# Patient Record
Sex: MALE | Race: WHITE | Employment: FULL TIME | URBAN - METROPOLITAN AREA
[De-identification: names, ages, dates, MRNs, and addresses within clinical notes are randomized per-mention and may not be internally consistent; named-entity substitution may affect disease eponyms.]

---

## 2022-01-10 ENCOUNTER — TELEMEDICINE (OUTPATIENT)
Dept: INTERNAL MEDICINE CLINIC | Facility: CLINIC | Age: 47
End: 2022-01-10

## 2022-01-10 ENCOUNTER — TELEPHONE (OUTPATIENT)
Dept: INTERNAL MEDICINE CLINIC | Facility: CLINIC | Age: 47
End: 2022-01-10

## 2022-01-10 DIAGNOSIS — Z20.822 SUSPECTED 2019 NOVEL CORONAVIRUS INFECTION: ICD-10-CM

## 2022-01-10 DIAGNOSIS — R05.9 COUGH: ICD-10-CM

## 2022-01-10 DIAGNOSIS — J01.10 ACUTE NON-RECURRENT FRONTAL SINUSITIS: Primary | ICD-10-CM

## 2022-01-10 PROCEDURE — 99204 OFFICE O/P NEW MOD 45 MIN: CPT | Performed by: INTERNAL MEDICINE

## 2022-01-10 RX ORDER — ALBUTEROL SULFATE 90 UG/1
2 AEROSOL, METERED RESPIRATORY (INHALATION) EVERY 6 HOURS PRN
Qty: 1 EACH | Refills: 0 | Status: SHIPPED | OUTPATIENT
Start: 2022-01-10 | End: 2022-02-07

## 2022-01-10 RX ORDER — BENZONATATE 200 MG/1
200 CAPSULE ORAL 3 TIMES DAILY PRN
Qty: 30 CAPSULE | Refills: 0 | Status: SHIPPED | OUTPATIENT
Start: 2022-01-10 | End: 2022-02-07

## 2022-01-10 RX ORDER — AMOXICILLIN AND CLAVULANATE POTASSIUM 875; 125 MG/1; MG/1
1 TABLET, FILM COATED ORAL 2 TIMES DAILY
Qty: 14 TABLET | Refills: 0 | Status: SHIPPED | OUTPATIENT
Start: 2022-01-10 | End: 2022-02-07

## 2022-01-10 RX ORDER — ACETAMINOPHEN 500 MG
500 TABLET ORAL EVERY 6 HOURS PRN
COMMUNITY
End: 2022-02-07

## 2022-01-10 NOTE — TELEPHONE ENCOUNTER
Virtual/Telephone Consent    Fortino Miller verbally {consents to a Virtual/Telephone Check-In service on 1/10/22. Patient understands and accepts financial responsibility for any deductible, co-insurance and/or co-pays associated with this service.

## 2022-01-10 NOTE — PROGRESS NOTES
Virtual Telephone Check-In    Bisi Garcia verbally consents to a Virtual/Telephone Check-In visit on 01/10/22. Patient has been referred to the [facility] website at [email or web address] to review the yearly Consent to Treat document.     Patient unders hours as needed for Wheezing. 1 each 0   • benzonatate 200 MG Oral Cap Take 1 capsule (200 mg total) by mouth 3 (three) times daily as needed for cough.  30 capsule 0       PAST MEDICAL, SOCIAL, AND FAMILY HISTORY:  Tobacco:      Smoking status: [employer] following visit was completed using two-way, real-time interactive audio and/or video communication.   This has been done in good sumaya to provide continuity of care in the best interest of the provider-patient relationship, due to the ongoing public health

## 2022-01-11 ENCOUNTER — LAB ENCOUNTER (OUTPATIENT)
Dept: LAB | Facility: HOSPITAL | Age: 47
End: 2022-01-11
Attending: INTERNAL MEDICINE
Payer: COMMERCIAL

## 2022-01-11 DIAGNOSIS — Z20.822 SUSPECTED 2019 NOVEL CORONAVIRUS INFECTION: ICD-10-CM

## 2022-01-13 LAB — SARS-COV-2 RNA RESP QL NAA+PROBE: DETECTED

## 2022-01-25 ENCOUNTER — PATIENT MESSAGE (OUTPATIENT)
Dept: INTERNAL MEDICINE CLINIC | Facility: CLINIC | Age: 47
End: 2022-01-25

## 2022-01-25 NOTE — TELEPHONE ENCOUNTER
From: John Templeton  To: Jorge Neri MD  Sent: 1/25/2022 7:44 AM CST  Subject: Colonoscopy    Dr Suman Aponte- I am past due for a colonoscopy check up and an annual physical. Assuming i can schedule physical with you. How do we do the colonoscopy appointment?

## 2022-01-29 DIAGNOSIS — R05.9 COUGH: ICD-10-CM

## 2022-01-29 DIAGNOSIS — J01.10 ACUTE NON-RECURRENT FRONTAL SINUSITIS: ICD-10-CM

## 2022-01-29 RX ORDER — ALBUTEROL SULFATE 90 UG/1
AEROSOL, METERED RESPIRATORY (INHALATION)
Qty: 8.5 EACH | Refills: 0 | OUTPATIENT
Start: 2022-01-29

## 2022-02-07 ENCOUNTER — OFFICE VISIT (OUTPATIENT)
Dept: INTERNAL MEDICINE CLINIC | Facility: CLINIC | Age: 47
End: 2022-02-07
Payer: COMMERCIAL

## 2022-02-07 VITALS
WEIGHT: 254.31 LBS | DIASTOLIC BLOOD PRESSURE: 70 MMHG | HEIGHT: 77.5 IN | SYSTOLIC BLOOD PRESSURE: 100 MMHG | HEART RATE: 64 BPM | TEMPERATURE: 98 F | RESPIRATION RATE: 12 BRPM | BODY MASS INDEX: 29.72 KG/M2

## 2022-02-07 DIAGNOSIS — Z00.00 PHYSICAL EXAM, ANNUAL: Primary | ICD-10-CM

## 2022-02-07 DIAGNOSIS — Z12.11 SCREENING FOR COLON CANCER: ICD-10-CM

## 2022-02-07 PROCEDURE — 3008F BODY MASS INDEX DOCD: CPT | Performed by: INTERNAL MEDICINE

## 2022-02-07 PROCEDURE — 99396 PREV VISIT EST AGE 40-64: CPT | Performed by: INTERNAL MEDICINE

## 2022-02-07 PROCEDURE — 3074F SYST BP LT 130 MM HG: CPT | Performed by: INTERNAL MEDICINE

## 2022-02-07 PROCEDURE — 3078F DIAST BP <80 MM HG: CPT | Performed by: INTERNAL MEDICINE

## 2022-02-11 PROCEDURE — 3052F HG A1C>EQUAL 8.0%<EQUAL 9.0%: CPT | Performed by: INTERNAL MEDICINE

## 2022-02-12 LAB
ABSOLUTE BASOPHILS: 48 CELLS/UL (ref 0–200)
ABSOLUTE EOSINOPHILS: 163 CELLS/UL (ref 15–500)
ABSOLUTE LYMPHOCYTES: 2285 CELLS/UL (ref 850–3900)
ABSOLUTE MONOCYTES: 585 CELLS/UL (ref 200–950)
ABSOLUTE NEUTROPHILS: 3720 CELLS/UL (ref 1500–7800)
ALBUMIN/GLOBULIN RATIO: 1.6 (CALC) (ref 1–2.5)
ALBUMIN: 4.7 G/DL (ref 3.6–5.1)
ALKALINE PHOSPHATASE: 162 U/L (ref 36–130)
ALT: 67 U/L (ref 9–46)
AST: 36 U/L (ref 10–40)
BASOPHILS: 0.7 %
BILIRUBIN, TOTAL: 1.8 MG/DL (ref 0.2–1.2)
BUN: 15 MG/DL (ref 7–25)
CALCIUM: 10.5 MG/DL (ref 8.6–10.3)
CARBON DIOXIDE: 27 MMOL/L (ref 20–32)
CHLORIDE: 100 MMOL/L (ref 98–110)
CHOL/HDLC RATIO: 5.2 (CALC)
CHOLESTEROL, TOTAL: 173 MG/DL
CREATININE: 0.89 MG/DL (ref 0.6–1.35)
EGFR IF AFRICN AM: 118 ML/MIN/1.73M2
EGFR IF NONAFRICN AM: 102 ML/MIN/1.73M2
GLOBULIN: 3 G/DL (CALC) (ref 1.9–3.7)
GLUCOSE: 239 MG/DL (ref 65–99)
HDL CHOLESTEROL: 33 MG/DL
HEMATOCRIT: 47.8 % (ref 38.5–50)
HEMOGLOBIN A1C: 8.6 % OF TOTAL HGB
HEMOGLOBIN: 15.9 G/DL (ref 13.2–17.1)
LDL-CHOLESTEROL: 100 MG/DL (CALC)
LYMPHOCYTES: 33.6 %
MCH: 28.9 PG (ref 27–33)
MCHC: 33.3 G/DL (ref 32–36)
MCV: 86.8 FL (ref 80–100)
MONOCYTES: 8.6 %
MPV: 10.4 FL (ref 7.5–12.5)
NEUTROPHILS: 54.7 %
NON-HDL CHOLESTEROL: 140 MG/DL (CALC)
PLATELET COUNT: 183 THOUSAND/UL (ref 140–400)
POTASSIUM: 4.4 MMOL/L (ref 3.5–5.3)
PROTEIN, TOTAL: 7.7 G/DL (ref 6.1–8.1)
RDW: 13.2 % (ref 11–15)
RED BLOOD CELL COUNT: 5.51 MILLION/UL (ref 4.2–5.8)
SODIUM: 136 MMOL/L (ref 135–146)
TRIGLYCERIDES: 282 MG/DL
TSH W/REFLEX TO FT4: 1.35 MIU/L (ref 0.4–4.5)
WHITE BLOOD CELL COUNT: 6.8 THOUSAND/UL (ref 3.8–10.8)

## 2022-02-14 ENCOUNTER — PATIENT MESSAGE (OUTPATIENT)
Dept: INTERNAL MEDICINE CLINIC | Facility: CLINIC | Age: 47
End: 2022-02-14

## 2022-02-15 ENCOUNTER — TELEPHONE (OUTPATIENT)
Dept: INTERNAL MEDICINE CLINIC | Facility: CLINIC | Age: 47
End: 2022-02-15

## 2022-02-15 NOTE — TELEPHONE ENCOUNTER
Incoming (mail or fax):  fax  Received from:  Dr Peter Roberts office  Documentation given to:  [facility] records

## 2022-02-16 ENCOUNTER — OFFICE VISIT (OUTPATIENT)
Dept: INTERNAL MEDICINE CLINIC | Facility: CLINIC | Age: 47
End: 2022-02-16
Payer: COMMERCIAL

## 2022-02-16 ENCOUNTER — TELEPHONE (OUTPATIENT)
Dept: INTERNAL MEDICINE CLINIC | Facility: CLINIC | Age: 47
End: 2022-02-16

## 2022-02-16 VITALS
SYSTOLIC BLOOD PRESSURE: 128 MMHG | HEIGHT: 77.5 IN | HEART RATE: 72 BPM | RESPIRATION RATE: 12 BRPM | WEIGHT: 252.19 LBS | DIASTOLIC BLOOD PRESSURE: 74 MMHG | BODY MASS INDEX: 29.48 KG/M2 | TEMPERATURE: 98 F

## 2022-02-16 DIAGNOSIS — E78.5 HYPERLIPIDEMIA, UNSPECIFIED HYPERLIPIDEMIA TYPE: ICD-10-CM

## 2022-02-16 DIAGNOSIS — R74.8 ELEVATED LIVER ENZYMES: Primary | ICD-10-CM

## 2022-02-16 DIAGNOSIS — E11.65 TYPE 2 DIABETES MELLITUS WITH HYPERGLYCEMIA, WITHOUT LONG-TERM CURRENT USE OF INSULIN (HCC): ICD-10-CM

## 2022-02-16 PROCEDURE — 3078F DIAST BP <80 MM HG: CPT | Performed by: INTERNAL MEDICINE

## 2022-02-16 PROCEDURE — 3008F BODY MASS INDEX DOCD: CPT | Performed by: INTERNAL MEDICINE

## 2022-02-16 PROCEDURE — 99214 OFFICE O/P EST MOD 30 MIN: CPT | Performed by: INTERNAL MEDICINE

## 2022-02-16 PROCEDURE — 3074F SYST BP LT 130 MM HG: CPT | Performed by: INTERNAL MEDICINE

## 2022-02-16 RX ORDER — METFORMIN HYDROCHLORIDE 500 MG/1
1000 TABLET, EXTENDED RELEASE ORAL 2 TIMES DAILY WITH MEALS
Qty: 120 TABLET | Refills: 0 | Status: SHIPPED | OUTPATIENT
Start: 2022-02-16 | End: 2022-03-21

## 2022-02-16 NOTE — TELEPHONE ENCOUNTER
New diagnosis of diabetes. rx done for supplies and glucometer. Please fax to his pharmacy. Let him know this was done, initially we had planned for him to get this at diabetic education but I think its better he get it from his pharmacy and bring it with him to diabetic education to learn how to use it. Thanks.

## 2022-02-17 ENCOUNTER — TELEPHONE (OUTPATIENT)
Dept: ENDOCRINOLOGY CLINIC | Facility: CLINIC | Age: 47
End: 2022-02-17

## 2022-02-18 ENCOUNTER — MED REC SCAN ONLY (OUTPATIENT)
Dept: INTERNAL MEDICINE CLINIC | Facility: CLINIC | Age: 47
End: 2022-02-18

## 2022-02-21 ENCOUNTER — TELEPHONE (OUTPATIENT)
Dept: INTERNAL MEDICINE CLINIC | Facility: CLINIC | Age: 47
End: 2022-02-21

## 2022-02-21 NOTE — TELEPHONE ENCOUNTER
Incoming (mail or fax):  fax  Received from:  [facility]   Documentation given to:  [address]

## 2022-02-24 ENCOUNTER — DIABETIC EDUCATION (OUTPATIENT)
Dept: ENDOCRINOLOGY CLINIC | Facility: CLINIC | Age: 47
End: 2022-02-24
Payer: COMMERCIAL

## 2022-02-24 VITALS — HEIGHT: 77 IN | WEIGHT: 252 LBS | BODY MASS INDEX: 29.76 KG/M2

## 2022-02-24 DIAGNOSIS — E11.65 TYPE 2 DIABETES MELLITUS WITH HYPERGLYCEMIA, WITHOUT LONG-TERM CURRENT USE OF INSULIN (HCC): Primary | ICD-10-CM

## 2022-02-24 PROCEDURE — G0108 DIAB MANAGE TRN  PER INDIV: HCPCS | Performed by: DIETITIAN, REGISTERED

## 2022-02-24 PROCEDURE — 3008F BODY MASS INDEX DOCD: CPT | Performed by: DIETITIAN, REGISTERED

## 2022-03-04 ENCOUNTER — HOSPITAL ENCOUNTER (OUTPATIENT)
Dept: ULTRASOUND IMAGING | Age: 47
Discharge: HOME | End: 2022-03-04
Attending: INTERNAL MEDICINE
Payer: COMMERCIAL

## 2022-03-04 DIAGNOSIS — R74.8 ELEVATED LIVER ENZYMES: ICD-10-CM

## 2022-03-04 PROCEDURE — 76700 US EXAM ABDOM COMPLETE: CPT | Performed by: INTERNAL MEDICINE

## 2022-03-10 PROBLEM — Z12.11 SPECIAL SCREENING FOR MALIGNANT NEOPLASM OF COLON: Status: ACTIVE | Noted: 2022-03-10

## 2022-03-10 PROBLEM — D12.8 BENIGN NEOPLASM OF RECTUM: Status: ACTIVE | Noted: 2022-03-10

## 2022-03-11 RX ORDER — POLYETHYLENE GLYCOL 3350, SODIUM SULFATE ANHYDROUS, SODIUM BICARBONATE, SODIUM CHLORIDE, POTASSIUM CHLORIDE 236; 22.74; 6.74; 5.86; 2.97 G/4L; G/4L; G/4L; G/4L; G/4L
4000 POWDER, FOR SOLUTION ORAL ONCE
COMMUNITY
Start: 2022-02-10 | End: 2022-03-17 | Stop reason: ALTCHOICE

## 2022-03-11 RX ORDER — LANCETS 33 GAUGE
1 EACH MISCELLANEOUS DAILY
COMMUNITY
Start: 2022-02-18

## 2022-03-11 RX ORDER — METFORMIN HYDROCHLORIDE 500 MG/1
TABLET, EXTENDED RELEASE ORAL
Qty: 120 TABLET | Refills: 0 | OUTPATIENT
Start: 2022-03-11

## 2022-03-11 RX ORDER — BLOOD-GLUCOSE METER
1 EACH MISCELLANEOUS DAILY
COMMUNITY
Start: 2022-02-16

## 2022-03-11 RX ORDER — BLOOD SUGAR DIAGNOSTIC
STRIP MISCELLANEOUS DAILY
COMMUNITY
Start: 2022-02-18

## 2022-03-11 NOTE — TELEPHONE ENCOUNTER
LMTCB to confirm if pt will run out of Medication prior to 3/17/22 apt with Dr. Carla Garland for Follow Up - DM.

## 2022-03-11 NOTE — TELEPHONE ENCOUNTER
Pt returned call and Pt will have enough of the medication until the appt. Pt was reminded to bring blood sugar log. Marylu Keating stated that we will just deny this refill request and refill at appt.  OK to deny refill request

## 2022-03-17 ENCOUNTER — OFFICE VISIT (OUTPATIENT)
Dept: INTERNAL MEDICINE CLINIC | Facility: CLINIC | Age: 47
End: 2022-03-17
Payer: COMMERCIAL

## 2022-03-17 VITALS
RESPIRATION RATE: 14 BRPM | TEMPERATURE: 98 F | BODY MASS INDEX: 28.5 KG/M2 | WEIGHT: 243.81 LBS | OXYGEN SATURATION: 96 % | HEART RATE: 67 BPM | HEIGHT: 77.5 IN | SYSTOLIC BLOOD PRESSURE: 108 MMHG | DIASTOLIC BLOOD PRESSURE: 70 MMHG

## 2022-03-17 DIAGNOSIS — Z23 NEED FOR VACCINATION: ICD-10-CM

## 2022-03-17 DIAGNOSIS — E11.65 TYPE 2 DIABETES MELLITUS WITH HYPERGLYCEMIA, WITHOUT LONG-TERM CURRENT USE OF INSULIN (HCC): ICD-10-CM

## 2022-03-17 DIAGNOSIS — E78.5 HYPERLIPIDEMIA, UNSPECIFIED HYPERLIPIDEMIA TYPE: ICD-10-CM

## 2022-03-17 DIAGNOSIS — R74.8 ELEVATED LIVER ENZYMES: ICD-10-CM

## 2022-03-17 PROCEDURE — 3078F DIAST BP <80 MM HG: CPT | Performed by: INTERNAL MEDICINE

## 2022-03-17 PROCEDURE — 99214 OFFICE O/P EST MOD 30 MIN: CPT | Performed by: INTERNAL MEDICINE

## 2022-03-17 PROCEDURE — 3008F BODY MASS INDEX DOCD: CPT | Performed by: INTERNAL MEDICINE

## 2022-03-17 PROCEDURE — 3074F SYST BP LT 130 MM HG: CPT | Performed by: INTERNAL MEDICINE

## 2022-03-20 ENCOUNTER — PATIENT MESSAGE (OUTPATIENT)
Dept: INTERNAL MEDICINE CLINIC | Facility: CLINIC | Age: 47
End: 2022-03-20

## 2022-03-21 RX ORDER — METFORMIN HYDROCHLORIDE 500 MG/1
1000 TABLET, EXTENDED RELEASE ORAL 2 TIMES DAILY WITH MEALS
Qty: 360 TABLET | Refills: 0 | Status: SHIPPED | OUTPATIENT
Start: 2022-03-21

## 2022-03-21 NOTE — TELEPHONE ENCOUNTER
From: Moni Burgos  To: Kodak March MD  Sent: 3/20/2022 10:10 AM CDT  Subject: Metformin refill    Hi Dr Ashely Griffin-    I will need a Metformin refill since I will continue taking it. The initial prescription was only 30 days and the refill request from pharmacy was rejected until we met.       Thanks,    Cristhian Sears

## 2022-03-21 NOTE — TELEPHONE ENCOUNTER
Last OV pertinent to medication 3/17/22  Last refill date: 2/16/22 #/refills: 120 w/ 0  When patient was asked to return for OV: 3 months  Upcomming appt/reason: None scheduled  Labs:   Last A1c value was 8.6% done 2/11/2022.

## 2022-04-20 RX ORDER — BLOOD SUGAR DIAGNOSTIC
STRIP MISCELLANEOUS
Qty: 100 STRIP | Refills: 2 | Status: SHIPPED | OUTPATIENT
Start: 2022-04-20

## 2022-06-03 PROCEDURE — 3044F HG A1C LEVEL LT 7.0%: CPT | Performed by: INTERNAL MEDICINE

## 2022-06-03 PROCEDURE — 3061F NEG MICROALBUMINURIA REV: CPT | Performed by: INTERNAL MEDICINE

## 2022-06-04 LAB
ALBUMIN/GLOBULIN RATIO: 1.6 (CALC) (ref 1–2.5)
ALBUMIN: 4.4 G/DL (ref 3.6–5.1)
ALKALINE PHOSPHATASE: 92 U/L (ref 36–130)
ALT: 22 U/L (ref 9–46)
AST: 18 U/L (ref 10–40)
BILIRUBIN, TOTAL: 1.2 MG/DL (ref 0.2–1.2)
BUN: 18 MG/DL (ref 7–25)
CALCIUM: 10.1 MG/DL (ref 8.6–10.3)
CARBON DIOXIDE: 28 MMOL/L (ref 20–32)
CHLORIDE: 105 MMOL/L (ref 98–110)
CHOL/HDLC RATIO: 4.6 (CALC)
CHOLESTEROL, TOTAL: 171 MG/DL
CREATININE, RANDOM URINE: 218 MG/DL (ref 20–320)
CREATININE: 0.89 MG/DL (ref 0.6–1.35)
EGFR IF AFRICN AM: 117 ML/MIN/1.73M2
EGFR IF NONAFRICN AM: 101 ML/MIN/1.73M2
GLOBULIN: 2.7 G/DL (CALC) (ref 1.9–3.7)
GLUCOSE: 110 MG/DL (ref 65–99)
HDL CHOLESTEROL: 37 MG/DL
HEMOGLOBIN A1C: 4.9 % OF TOTAL HGB
LDL-CHOLESTEROL: 111 MG/DL (CALC)
MICROALBUMIN/CREATININE RATIO, RANDOM URINE: 8 MCG/MG CREAT
MICROALBUMIN: 1.7 MG/DL
NON-HDL CHOLESTEROL: 134 MG/DL (CALC)
POTASSIUM: 4.4 MMOL/L (ref 3.5–5.3)
PROTEIN, TOTAL: 7.1 G/DL (ref 6.1–8.1)
SODIUM: 139 MMOL/L (ref 135–146)
TRIGLYCERIDES: 122 MG/DL

## 2022-06-19 DIAGNOSIS — E11.65 TYPE 2 DIABETES MELLITUS WITH HYPERGLYCEMIA, WITHOUT LONG-TERM CURRENT USE OF INSULIN (HCC): ICD-10-CM

## 2022-06-20 ENCOUNTER — OFFICE VISIT (OUTPATIENT)
Dept: INTERNAL MEDICINE CLINIC | Facility: CLINIC | Age: 47
End: 2022-06-20
Payer: COMMERCIAL

## 2022-06-20 VITALS
SYSTOLIC BLOOD PRESSURE: 98 MMHG | TEMPERATURE: 98 F | RESPIRATION RATE: 12 BRPM | WEIGHT: 239.69 LBS | HEIGHT: 77.5 IN | HEART RATE: 76 BPM | DIASTOLIC BLOOD PRESSURE: 62 MMHG | BODY MASS INDEX: 28.01 KG/M2

## 2022-06-20 DIAGNOSIS — E11.65 TYPE 2 DIABETES MELLITUS WITH HYPERGLYCEMIA, WITHOUT LONG-TERM CURRENT USE OF INSULIN (HCC): ICD-10-CM

## 2022-06-20 DIAGNOSIS — E78.5 HYPERLIPIDEMIA, UNSPECIFIED HYPERLIPIDEMIA TYPE: Primary | ICD-10-CM

## 2022-06-20 DIAGNOSIS — K76.0 FATTY LIVER: ICD-10-CM

## 2022-06-20 PROCEDURE — 3074F SYST BP LT 130 MM HG: CPT | Performed by: INTERNAL MEDICINE

## 2022-06-20 PROCEDURE — 3008F BODY MASS INDEX DOCD: CPT | Performed by: INTERNAL MEDICINE

## 2022-06-20 PROCEDURE — 3078F DIAST BP <80 MM HG: CPT | Performed by: INTERNAL MEDICINE

## 2022-06-20 PROCEDURE — 99214 OFFICE O/P EST MOD 30 MIN: CPT | Performed by: INTERNAL MEDICINE

## 2022-06-20 RX ORDER — METFORMIN HYDROCHLORIDE 500 MG/1
1000 TABLET, EXTENDED RELEASE ORAL 2 TIMES DAILY WITH MEALS
Qty: 360 TABLET | Refills: 1 | Status: SHIPPED | OUTPATIENT
Start: 2022-06-20

## 2022-06-21 RX ORDER — METFORMIN HYDROCHLORIDE 500 MG/1
TABLET, EXTENDED RELEASE ORAL
Qty: 360 TABLET | Refills: 0 | OUTPATIENT
Start: 2022-06-21

## 2022-09-07 ENCOUNTER — PATIENT MESSAGE (OUTPATIENT)
Dept: INTERNAL MEDICINE CLINIC | Facility: CLINIC | Age: 47
End: 2022-09-07

## 2022-09-07 NOTE — TELEPHONE ENCOUNTER
From: Kiley Snowden  To: Jaimie Licea  Sent: 9/7/2022 10:25 AM CDT  Subject: lab reminder    Guilford Angelucci,    After careful review of your medical record it has come to our attention that you are Overdue for Labs. To receive future refills on your medications coming due the labs need to be completed first.    The Labs are Fasting so: No Food for 10-12 hrs, Drink Plenty of Water, Take Medications at least 1 hour prior to blood draw. Refrain from taking any Biotin or Biotin containing supplements at least 3-4 days prior to blood draw. We take each of our patient's health very seriously and the key to maintaining your health is to routinely follow-up as planned with providers and lab work.     Please call our office directly if you have any questions at [phone].    Osmani Muse RN

## 2022-12-13 DIAGNOSIS — E11.65 TYPE 2 DIABETES MELLITUS WITH HYPERGLYCEMIA, WITHOUT LONG-TERM CURRENT USE OF INSULIN (HCC): ICD-10-CM

## 2022-12-14 RX ORDER — METFORMIN HYDROCHLORIDE 500 MG/1
1000 TABLET, EXTENDED RELEASE ORAL 2 TIMES DAILY WITH MEALS
Qty: 360 TABLET | Refills: 0 | Status: SHIPPED | OUTPATIENT
Start: 2022-12-14

## 2022-12-27 RX ORDER — BLOOD SUGAR DIAGNOSTIC
1 STRIP MISCELLANEOUS 3 TIMES DAILY
Qty: 100 STRIP | Refills: 3 | Status: SHIPPED | OUTPATIENT
Start: 2022-12-27

## 2022-12-27 RX ORDER — LANCETS 33 GAUGE
1 EACH MISCELLANEOUS DAILY
Qty: 100 EACH | Refills: 3 | Status: SHIPPED | OUTPATIENT
Start: 2022-12-27

## 2023-01-23 LAB
ALBUMIN/GLOBULIN RATIO: 1.8 (CALC) (ref 1–2.5)
ALBUMIN: 4.7 G/DL (ref 3.6–5.1)
ALKALINE PHOSPHATASE: 89 U/L (ref 36–130)
ALT: 34 U/L (ref 9–46)
AST: 23 U/L (ref 10–40)
BILIRUBIN, TOTAL: 1.8 MG/DL (ref 0.2–1.2)
BUN: 18 MG/DL (ref 7–25)
CALCIUM: 10.8 MG/DL (ref 8.6–10.3)
CARBON DIOXIDE: 32 MMOL/L (ref 20–32)
CHLORIDE: 105 MMOL/L (ref 98–110)
CHOL/HDLC RATIO: 4.9 (CALC)
CHOLESTEROL, TOTAL: 147 MG/DL
CREATININE: 0.91 MG/DL (ref 0.6–1.29)
EGFR: 104 ML/MIN/1.73M2
GLOBULIN: 2.6 G/DL (CALC) (ref 1.9–3.7)
GLUCOSE: 107 MG/DL (ref 65–99)
HDL CHOLESTEROL: 30 MG/DL
LDL-CHOLESTEROL: 90 MG/DL (CALC)
NON-HDL CHOLESTEROL: 117 MG/DL (CALC)
POTASSIUM: 4.6 MMOL/L (ref 3.5–5.3)
PROTEIN, TOTAL: 7.3 G/DL (ref 6.1–8.1)
SODIUM: 140 MMOL/L (ref 135–146)
TRIGLYCERIDES: 178 MG/DL

## 2023-01-23 PROCEDURE — 3044F HG A1C LEVEL LT 7.0%: CPT | Performed by: INTERNAL MEDICINE

## 2023-01-24 LAB
ALBUMIN/GLOBULIN RATIO: 1.8 (CALC) (ref 1–2.5)
ALBUMIN: 4.7 G/DL (ref 3.6–5.1)
ALKALINE PHOSPHATASE: 89 U/L (ref 36–130)
ALT: 34 U/L (ref 9–46)
AST: 23 U/L (ref 10–40)
BILIRUBIN, TOTAL: 1.8 MG/DL (ref 0.2–1.2)
BUN: 18 MG/DL (ref 7–25)
CALCIUM: 10.8 MG/DL (ref 8.6–10.3)
CARBON DIOXIDE: 32 MMOL/L (ref 20–32)
CHLORIDE: 105 MMOL/L (ref 98–110)
CHOL/HDLC RATIO: 4.9 (CALC)
CHOLESTEROL, TOTAL: 147 MG/DL
CREATININE: 0.91 MG/DL (ref 0.6–1.29)
EGFR: 104 ML/MIN/1.73M2
GLOBULIN: 2.6 G/DL (CALC) (ref 1.9–3.7)
GLUCOSE: 107 MG/DL (ref 65–99)
HDL CHOLESTEROL: 30 MG/DL
HEMOGLOBIN A1C: 5 % OF TOTAL HGB
LDL-CHOLESTEROL: 90 MG/DL (CALC)
NON-HDL CHOLESTEROL: 117 MG/DL (CALC)
POTASSIUM: 4.6 MMOL/L (ref 3.5–5.3)
PROTEIN, TOTAL: 7.3 G/DL (ref 6.1–8.1)
SODIUM: 140 MMOL/L (ref 135–146)
TRIGLYCERIDES: 178 MG/DL

## 2023-01-27 ENCOUNTER — OFFICE VISIT (OUTPATIENT)
Dept: INTERNAL MEDICINE CLINIC | Facility: CLINIC | Age: 48
End: 2023-01-27
Payer: COMMERCIAL

## 2023-01-27 VITALS
HEART RATE: 64 BPM | WEIGHT: 254.81 LBS | SYSTOLIC BLOOD PRESSURE: 122 MMHG | BODY MASS INDEX: 29.48 KG/M2 | TEMPERATURE: 98 F | DIASTOLIC BLOOD PRESSURE: 74 MMHG | RESPIRATION RATE: 12 BRPM | HEIGHT: 78 IN

## 2023-01-27 DIAGNOSIS — E78.5 HYPERLIPIDEMIA, UNSPECIFIED HYPERLIPIDEMIA TYPE: ICD-10-CM

## 2023-01-27 DIAGNOSIS — R17 ELEVATED BILIRUBIN: ICD-10-CM

## 2023-01-27 DIAGNOSIS — E83.52 HYPERCALCEMIA: ICD-10-CM

## 2023-01-27 DIAGNOSIS — R73.03 PREDIABETES: ICD-10-CM

## 2023-01-27 PROBLEM — E11.65 TYPE 2 DIABETES MELLITUS WITH HYPERGLYCEMIA, WITHOUT LONG-TERM CURRENT USE OF INSULIN (HCC): Status: RESOLVED | Noted: 2022-02-16 | Resolved: 2023-01-27

## 2023-01-27 PROCEDURE — 3078F DIAST BP <80 MM HG: CPT | Performed by: INTERNAL MEDICINE

## 2023-01-27 PROCEDURE — 99214 OFFICE O/P EST MOD 30 MIN: CPT | Performed by: INTERNAL MEDICINE

## 2023-01-27 PROCEDURE — 3008F BODY MASS INDEX DOCD: CPT | Performed by: INTERNAL MEDICINE

## 2023-01-27 PROCEDURE — 3074F SYST BP LT 130 MM HG: CPT | Performed by: INTERNAL MEDICINE

## 2023-01-27 RX ORDER — METFORMIN HYDROCHLORIDE 500 MG/1
500 TABLET, EXTENDED RELEASE ORAL 2 TIMES DAILY
COMMUNITY
Start: 2023-01-27

## 2023-03-06 ENCOUNTER — TELEPHONE (OUTPATIENT)
Dept: INTERNAL MEDICINE CLINIC | Facility: CLINIC | Age: 48
End: 2023-03-06

## 2023-03-06 NOTE — TELEPHONE ENCOUNTER
Incoming (mail or fax):  fax  Received from:  Dr Jessica Herron office  Documentation given to:  Lulu    Diabetic eye exam

## 2023-03-15 ENCOUNTER — MED REC SCAN ONLY (OUTPATIENT)
Dept: INTERNAL MEDICINE CLINIC | Facility: CLINIC | Age: 48
End: 2023-03-15

## 2023-03-24 DIAGNOSIS — E11.65 TYPE 2 DIABETES MELLITUS WITH HYPERGLYCEMIA (HCC): ICD-10-CM

## 2023-03-24 RX ORDER — METFORMIN HYDROCHLORIDE 500 MG/1
TABLET, EXTENDED RELEASE ORAL
Qty: 360 TABLET | Refills: 1 | Status: SHIPPED | OUTPATIENT
Start: 2023-03-24

## 2023-07-11 ENCOUNTER — TELEPHONE (OUTPATIENT)
Dept: INTERNAL MEDICINE CLINIC | Facility: CLINIC | Age: 48
End: 2023-07-11

## 2023-07-11 ENCOUNTER — OFFICE VISIT (OUTPATIENT)
Dept: INTERNAL MEDICINE CLINIC | Facility: CLINIC | Age: 48
End: 2023-07-11
Payer: COMMERCIAL

## 2023-07-11 VITALS
SYSTOLIC BLOOD PRESSURE: 120 MMHG | TEMPERATURE: 97 F | WEIGHT: 259.38 LBS | HEART RATE: 72 BPM | DIASTOLIC BLOOD PRESSURE: 70 MMHG | BODY MASS INDEX: 30.63 KG/M2 | HEIGHT: 77 IN

## 2023-07-11 DIAGNOSIS — R73.03 PRE-DIABETES: ICD-10-CM

## 2023-07-11 DIAGNOSIS — H53.8 BLURRY VISION: Primary | ICD-10-CM

## 2023-07-11 PROCEDURE — 3078F DIAST BP <80 MM HG: CPT | Performed by: NURSE PRACTITIONER

## 2023-07-11 PROCEDURE — 3074F SYST BP LT 130 MM HG: CPT | Performed by: NURSE PRACTITIONER

## 2023-07-11 PROCEDURE — 99214 OFFICE O/P EST MOD 30 MIN: CPT | Performed by: NURSE PRACTITIONER

## 2023-07-11 PROCEDURE — 3008F BODY MASS INDEX DOCD: CPT | Performed by: NURSE PRACTITIONER

## 2023-07-11 NOTE — TELEPHONE ENCOUNTER
Pt calling said he started to have cloudy feeling in both of the eyes. Pt took out his contacts and still has has haze in the eyes. Pt denies pain in the eyes, vison changes , N/V, pain in anywhere in the body, sinus issues.  Appt made for the pt today     Future Appointments   Date Time Provider Frannie Layne   7/11/2023 12:40 PM DAVID Epps [facility] [place]

## 2023-07-11 NOTE — PATIENT INSTRUCTIONS
Continue to monitor blood glucose daily for the next 2 weeks and record in log. Follow up in 2-3 weeks for your annual physical and medication check. Bring your blood glucose reading log with you. Notify us if the blood glucose is <70 or >250. Follow a low carb diet and regular exercise. You should follow up with your ophthalmologist to have your eyes examined thoroughly due to the transient episode of blurry vision.

## 2023-09-08 ENCOUNTER — OFFICE VISIT (OUTPATIENT)
Dept: INTERNAL MEDICINE CLINIC | Facility: CLINIC | Age: 48
End: 2023-09-08
Payer: COMMERCIAL

## 2023-09-08 VITALS
DIASTOLIC BLOOD PRESSURE: 66 MMHG | SYSTOLIC BLOOD PRESSURE: 112 MMHG | TEMPERATURE: 98 F | WEIGHT: 257 LBS | HEART RATE: 78 BPM | BODY MASS INDEX: 30.04 KG/M2 | OXYGEN SATURATION: 97 % | RESPIRATION RATE: 20 BRPM | HEIGHT: 77.5 IN

## 2023-09-08 DIAGNOSIS — E78.5 HYPERLIPIDEMIA, UNSPECIFIED HYPERLIPIDEMIA TYPE: ICD-10-CM

## 2023-09-08 DIAGNOSIS — Z00.00 PHYSICAL EXAM, ANNUAL: Primary | ICD-10-CM

## 2023-09-08 DIAGNOSIS — E11.65 TYPE 2 DIABETES MELLITUS WITH HYPERGLYCEMIA, WITHOUT LONG-TERM CURRENT USE OF INSULIN (HCC): ICD-10-CM

## 2023-09-08 DIAGNOSIS — H53.8 BLURRED VISION, BILATERAL: ICD-10-CM

## 2023-09-08 PROCEDURE — 3078F DIAST BP <80 MM HG: CPT | Performed by: INTERNAL MEDICINE

## 2023-09-08 PROCEDURE — 3074F SYST BP LT 130 MM HG: CPT | Performed by: INTERNAL MEDICINE

## 2023-09-08 PROCEDURE — 3008F BODY MASS INDEX DOCD: CPT | Performed by: INTERNAL MEDICINE

## 2023-09-08 PROCEDURE — 99214 OFFICE O/P EST MOD 30 MIN: CPT | Performed by: INTERNAL MEDICINE

## 2023-09-08 PROCEDURE — 99396 PREV VISIT EST AGE 40-64: CPT | Performed by: INTERNAL MEDICINE

## 2023-10-20 ENCOUNTER — HOSPITAL ENCOUNTER (OUTPATIENT)
Dept: ULTRASOUND IMAGING | Age: 48
Discharge: HOME | End: 2023-10-20
Attending: INTERNAL MEDICINE
Payer: COMMERCIAL

## 2023-10-20 DIAGNOSIS — H53.8 BLURRED VISION, BILATERAL: ICD-10-CM

## 2023-10-20 PROCEDURE — 93880 EXTRACRANIAL BILAT STUDY: CPT | Performed by: INTERNAL MEDICINE

## 2024-03-21 DIAGNOSIS — E11.65 TYPE 2 DIABETES MELLITUS WITH HYPERGLYCEMIA (HCC): ICD-10-CM

## 2024-03-22 RX ORDER — METFORMIN HYDROCHLORIDE 500 MG/1
500 TABLET, EXTENDED RELEASE ORAL 2 TIMES DAILY WITH MEALS
Qty: 180 TABLET | Refills: 0 | Status: SHIPPED | OUTPATIENT
Start: 2024-03-22

## 2024-03-22 NOTE — TELEPHONE ENCOUNTER
Please be sure to reconcile with med list. Per med list he is now taking 1 tablet bid. Please confirm this with him and okay to refill.

## 2024-03-22 NOTE — TELEPHONE ENCOUNTER
Per last fill- low med adherence. Patient requested via mcm stating \"Will be out of current supply soon\". We can call patient if needed to see if he is taking as prescribed or why it has been so long since last refill.    Last OV relevant to medication: 9/8/23   Last refill date: 3/24/23     #/refills: 360/1  When pt was asked to return for OV:   Return in about 6 months (around 3/8/2024).      Upcoming appt/reason:   Future Appointments   Date Time Provider Department Center   5/9/2024  7:20 AM Roxana Richardson MD [facility]       Was pt informed of any over due labs: overdue mcm sent.     Lab Results   Component Value Date     (H) 07/07/2023    BUN 14 07/07/2023    BUNCREA NOT APPLICABLE 07/07/2023    CREATSERUM 0.98 07/07/2023    GFRNAA 101 06/03/2022    GFRAA 117 06/03/2022    CA 10.1 07/07/2023    ALKPHO 85 07/07/2023    AST 24 07/07/2023    ALT 45 07/07/2023    BILT 2.0 (H) 07/07/2023    TP 6.9 07/07/2023    ALB 4.3 07/07/2023    GLOBULIN 2.6 07/07/2023    AGRATIO 1.7 07/07/2023     07/07/2023    K 4.6 07/07/2023     07/07/2023    CO2 28 07/07/2023       Lab Results   Component Value Date    A1C 5.4 07/07/2023       Lab Results   Component Value Date    MICROALBUMIN 1.7 06/03/2022    CREAUR 218 06/03/2022    MALBCREACALC 8 06/03/2022

## 2024-04-26 ENCOUNTER — TELEPHONE (OUTPATIENT)
Dept: INTERNAL MEDICINE CLINIC | Facility: CLINIC | Age: 49
End: 2024-04-26

## 2024-05-03 LAB
ALBUMIN/GLOBULIN RATIO: 1.8 (CALC) (ref 1–2.5)
ALBUMIN: 4.4 G/DL (ref 3.6–5.1)
ALKALINE PHOSPHATASE: 113 U/L (ref 36–130)
ALT: 56 U/L (ref 9–46)
AST: 26 U/L (ref 10–40)
BILIRUBIN, TOTAL: 1.2 MG/DL (ref 0.2–1.2)
BUN: 20 MG/DL (ref 7–25)
CALCIUM: 10.3 MG/DL (ref 8.6–10.3)
CARBON DIOXIDE: 25 MMOL/L (ref 20–32)
CHLORIDE: 105 MMOL/L (ref 98–110)
CHOL/HDLC RATIO: 4.7 (CALC)
CHOLESTEROL, TOTAL: 159 MG/DL
CREATININE, RANDOM URINE: 124 MG/DL (ref 20–320)
CREATININE: 0.86 MG/DL (ref 0.6–1.29)
EGFR: 106 ML/MIN/1.73M2
GLOBULIN: 2.4 G/DL (CALC) (ref 1.9–3.7)
GLUCOSE: 168 MG/DL (ref 65–99)
HDL CHOLESTEROL: 34 MG/DL
HEMOGLOBIN A1C: 6.4 % OF TOTAL HGB
LDL-CHOLESTEROL: 99 MG/DL (CALC)
MICROALBUMIN/CREATININE RATIO, RANDOM URINE: 8 MG/G CREAT
MICROALBUMIN: 1 MG/DL
NON-HDL CHOLESTEROL: 125 MG/DL (CALC)
POTASSIUM: 4.6 MMOL/L (ref 3.5–5.3)
PROTEIN, TOTAL: 6.8 G/DL (ref 6.1–8.1)
SODIUM: 140 MMOL/L (ref 135–146)
TRIGLYCERIDES: 154 MG/DL

## 2024-05-09 ENCOUNTER — OFFICE VISIT (OUTPATIENT)
Dept: INTERNAL MEDICINE CLINIC | Facility: CLINIC | Age: 49
End: 2024-05-09
Payer: COMMERCIAL

## 2024-05-09 VITALS
DIASTOLIC BLOOD PRESSURE: 78 MMHG | OXYGEN SATURATION: 97 % | SYSTOLIC BLOOD PRESSURE: 114 MMHG | RESPIRATION RATE: 14 BRPM | HEART RATE: 70 BPM | TEMPERATURE: 98 F | WEIGHT: 256 LBS | HEIGHT: 77 IN | BODY MASS INDEX: 30.23 KG/M2

## 2024-05-09 DIAGNOSIS — E78.5 HYPERLIPIDEMIA, UNSPECIFIED HYPERLIPIDEMIA TYPE: ICD-10-CM

## 2024-05-09 DIAGNOSIS — Z00.00 PHYSICAL EXAM, ANNUAL: ICD-10-CM

## 2024-05-09 DIAGNOSIS — Z12.5 SCREENING FOR PROSTATE CANCER: ICD-10-CM

## 2024-05-09 DIAGNOSIS — R74.8 ELEVATED LIVER ENZYMES: ICD-10-CM

## 2024-05-09 DIAGNOSIS — N52.9 ERECTILE DYSFUNCTION, UNSPECIFIED ERECTILE DYSFUNCTION TYPE: ICD-10-CM

## 2024-05-09 DIAGNOSIS — E11.65 TYPE 2 DIABETES MELLITUS WITH HYPERGLYCEMIA, WITHOUT LONG-TERM CURRENT USE OF INSULIN (HCC): ICD-10-CM

## 2024-05-09 PROCEDURE — 99214 OFFICE O/P EST MOD 30 MIN: CPT | Performed by: INTERNAL MEDICINE

## 2024-05-09 RX ORDER — TADALAFIL 5 MG/1
TABLET ORAL
Qty: 20 TABLET | Refills: 0 | Status: SHIPPED | OUTPATIENT
Start: 2024-05-09

## 2024-05-09 NOTE — PROGRESS NOTES
[facility]    CHIEF COMPLAINT:    Chief Complaint   Patient presents with    Follow - Up     Diabetic follow-up  Diabetic eye exam 4/24/24 (no retinopathy)         HISTORY OF PRESENT ILLNESS:  here for med check.   Dm: A1c up a bit to 6.4. on metformin.   Eye exam done 4/2024 with no retinopathy.   Foot exam due.     Hyperlipidemia: elevated TG, low HDL.  He had a carotid doppler that showed no disease.     One liver enzyme mildly elevated.     Has not been exercising as much and diet has not been the best. He is getting back to doing both.     Complains of erectile dysfunction. Gets an erection but not strong enough. Requesting viagra or cialis. No urinary complaints. No premature ejaculation.     REVIEW OF SYSTEMS:  See HPI    Current Medications:    Current Outpatient Medications   Medication Sig Dispense Refill    tadalafil (CIALIS) 5 MG Oral Tab Take 1-2 tablets (5-10 mg total) by mouth daily as needed for Erectile Dysfunction. 20 tablet 0    metFORMIN  MG Oral Tablet 24 Hr Take 1 tablet (500 mg total) by mouth 2 (two) times daily with meals. 180 tablet 0    Lancets (ONETOUCH DELICA PLUS [id number]) Does not apply Misc 1 strip by In Vitro route daily. 100 each 3    Glucose Blood (ONETOUCH ULTRA) In Vitro Strip 1 each by Other route 3 (three) times daily. 100 strip 3    Blood Glucose Monitoring Suppl (ONETOUCH ULTRA 2) w/Device Does not apply Kit 1 strip by In Vitro route daily.      Ascorbic Acid (VITAMIN C OR) Take by mouth daily.      Cholecalciferol (VITAMIN D-3 OR) Take by mouth daily.         PAST MEDICAL, SOCIAL, AND FAMILY HISTORY:  Tobacco:    History   Smoking Status    Former    Packs/day: 1.00    Years: 19.00    Types: Cigarettes    Quit date: 5/17/2011   Smokeless Tobacco    Never       PHYSICAL EXAM:  /78   Pulse 70   Temp 97.6 °F (36.4 °C) (Temporal)   Resp 14   Ht 6' 5\" (1.956 m)   Wt 256 lb (116.1 kg)   SpO2 97%   BMI 30.36 kg/m²    GENERAL: well developed, well  nourished,in no apparent distress  LUNGS: clear to auscultation  CARDIO: RRR without murmur  GI: good BS's,no masses, HSM or tenderness  MUSCULOSKELETAL:  no edema, muscle strength normal.   Bilateral barefoot skin diabetic exam is normal, visualized feet and the appearance is normal.  Bilateral monofilament/sensation of both feet is normal.  Pulsation pedal pulse exam of both lower legs/feet is normal as well.       DATA:  Results for orders placed or performed in visit on 09/08/23   CMP in 6 months   Result Value Ref Range    GLUCOSE 168 (H) 65 - 99 mg/dL    UREA NITROGEN (BUN) 20 7 - 25 mg/dL    CREATININE 0.86 0.60 - 1.29 mg/dL    EGFR 106 > OR = 60 mL/min/1.73m2    BUN/CREATININE RATIO SEE NOTE: 6 - 22 (calc)    SODIUM 140 135 - 146 mmol/L    POTASSIUM 4.6 3.5 - 5.3 mmol/L    CHLORIDE 105 98 - 110 mmol/L    CARBON DIOXIDE 25 20 - 32 mmol/L    CALCIUM 10.3 8.6 - 10.3 mg/dL    PROTEIN, TOTAL 6.8 6.1 - 8.1 g/dL    ALBUMIN 4.4 3.6 - 5.1 g/dL    GLOBULIN 2.4 1.9 - 3.7 g/dL (calc)    ALBUMIN/GLOBULIN RATIO 1.8 1.0 - 2.5 (calc)    BILIRUBIN, TOTAL 1.2 0.2 - 1.2 mg/dL    ALKALINE PHOSPHATASE 113 36 - 130 U/L    AST 26 10 - 40 U/L    ALT 56 (H) 9 - 46 U/L   Lipid in 6 months   Result Value Ref Range    CHOLESTEROL, TOTAL 159 <200 mg/dL    HDL CHOLESTEROL 34 (L) > OR = 40 mg/dL    TRIGLYCERIDES 154 (H) <150 mg/dL    LDL-CHOLESTEROL 99 mg/dL (calc)    CHOL/HDLC RATIO 4.7 <5.0 (calc)    NON-HDL CHOLESTEROL 125 <130 mg/dL (calc)   Hemoglobin A1C in 6 months   Result Value Ref Range    HEMOGLOBIN A1c 6.4 (H) <5.7 % of total Hgb   Microalb/Creat Ratio, Random Urine in 6 months   Result Value Ref Range    CREATININE, RANDOM URINE 124 20 - 320 mg/dL    MICROALBUMIN 1.0 See Note: mg/dL    MICROALBUMIN/CREATININE RATIO, RANDOM URINE 8 <30 mg/g creat            ASSESSMENT AND PLAN:  1. Type 2 diabetes mellitus with hyperglycemia, without long-term current use of insulin (HCC)  Continue metformin.   Get back on low carb diet and  regular exercise.   Foot exam done today.   Eye exam up to date.     2. Hyperlipidemia, unspecified hyperlipidemia type  Not on statin.   LDL is under 100.   Recommended statin per ADA guidelines. He declines for now but will look into it.     3. Erectile dysfunction, unspecified erectile dysfunction type  Trial cialis. Start with trying the 5mg strength and if that does not work can do 10mg. Let me know what works for him.   - tadalafil (CIALIS) 5 MG Oral Tab; Take 1-2 tablets (5-10 mg total) by mouth daily as needed for Erectile Dysfunction.  Dispense: 20 tablet; Refill: 0    4. Elevated liver enzymes  Repeat HFP in a month.   - Hepatic Function Panel (7) [E]    5. Physical exam, annual  - CBC With Differential With Platelet  - Comp Metabolic Panel  - Lipid Panel  - Hemoglobin A1C  - TSH W Reflex To Free T4  Labs prior to cpx.     6. Screening for prostate cancer  - PSA ([facility]) do prior to cpx.         Return in about 6 months (around 11/9/2024).      Roxana Richardson MD

## 2024-05-09 NOTE — PATIENT INSTRUCTIONS
THE [employer] RECOMMENDS THAT YOU SHOULD BE ON A STATIN MEDICATION (ROSUVASTATIN) FOR CARDIAC PROTECTION AND FOR FURTHER LOWERING OF THE CHOLESTEROL FOR EVERYONE WHO IS A DIABETIC. YOU ARE CURRENTLY NOT ON THIS MEDICATION. PLEASE LET ME KNOW IF YOU WOULD LIKE TO START THIS.

## 2024-06-29 DIAGNOSIS — E11.65 TYPE 2 DIABETES MELLITUS WITH HYPERGLYCEMIA (HCC): ICD-10-CM

## 2024-07-01 RX ORDER — METFORMIN HYDROCHLORIDE 500 MG/1
500 TABLET, EXTENDED RELEASE ORAL 2 TIMES DAILY WITH MEALS
Qty: 180 TABLET | Refills: 1 | Status: SHIPPED | OUTPATIENT
Start: 2024-07-01

## 2024-07-01 NOTE — TELEPHONE ENCOUNTER
Diabetes Medication Protocol [id number]/29/2024 01:12 AM   Protocol Details Last A1C < 7.5 and within past 6 months    In person appointment or virtual visit in the past 6 mos or appointment in next 3 mos    Microalbumin procedure in past 12 months or taking ACE/ARB    EGFRCR or GFRNAA > 50    GFR in the past 12 months      Type 2 diabetes mellitus with hyperglycemia, without long-term current use of insulin (HCC)  Continue metformin.   Get back on low carb diet and regular exercise.   Foot exam done today.   Eye exam up to date.  Return in about 6 months (around 11/9/2024).

## 2024-10-03 DIAGNOSIS — E11.65 TYPE 2 DIABETES MELLITUS WITH HYPERGLYCEMIA (HCC): ICD-10-CM

## 2024-10-07 RX ORDER — METFORMIN HCL 500 MG
500 TABLET, EXTENDED RELEASE 24 HR ORAL 2 TIMES DAILY WITH MEALS
Qty: 180 TABLET | Refills: 0 | Status: SHIPPED | OUTPATIENT
Start: 2024-10-07

## 2024-10-07 NOTE — TELEPHONE ENCOUNTER
Last OV relevant to medication: 5/9/2024  Last refill date: 7/1/24     #/refills: 180  When pt was asked to return for OV: 6 mo  Upcoming appt/reason: not sched.yet  Was pt informed of any over due labs: due Nov

## 2025-01-05 DIAGNOSIS — E11.65 TYPE 2 DIABETES MELLITUS WITH HYPERGLYCEMIA (HCC): ICD-10-CM

## 2025-01-07 RX ORDER — METFORMIN HYDROCHLORIDE 500 MG/1
500 TABLET, EXTENDED RELEASE ORAL 2 TIMES DAILY WITH MEALS
Qty: 60 TABLET | Refills: 0 | Status: SHIPPED | OUTPATIENT
Start: 2025-01-07

## 2025-01-07 NOTE — TELEPHONE ENCOUNTER
Last OV relevant to medication: 5/9/24  Last refill date: 10/7/24 #180/refills: 0  When pt was asked to return for OV: 11/9/24  Upcoming appt/reason: No future appointments.  Was pt informed of any over due labs: overdue-message and letter sent w/ orders  Lab Results   Component Value Date    A1C 6.4 (H) 05/02/2024     Pt is overdue for annual physical exam/med check. Please call to schedule. Thanks!

## 2025-01-07 NOTE — TELEPHONE ENCOUNTER
Talked to patient and let him know he was overdue for physical. Patient states that he would call back to make appointment or make appointment on MyChart because he didn't have calendar with him.

## 2025-02-06 DIAGNOSIS — Z13.0 SCREENING FOR ENDOCRINE, METABOLIC AND IMMUNITY DISORDER: ICD-10-CM

## 2025-02-06 DIAGNOSIS — Z13.228 SCREENING FOR ENDOCRINE, METABOLIC AND IMMUNITY DISORDER: ICD-10-CM

## 2025-02-06 DIAGNOSIS — E11.65 TYPE 2 DIABETES MELLITUS WITH HYPERGLYCEMIA (HCC): ICD-10-CM

## 2025-02-06 DIAGNOSIS — Z00.00 PHYSICAL EXAM, ANNUAL: ICD-10-CM

## 2025-02-06 DIAGNOSIS — Z13.29 SCREENING FOR ENDOCRINE, METABOLIC AND IMMUNITY DISORDER: ICD-10-CM

## 2025-02-06 DIAGNOSIS — Z13.29 SCREENING FOR THYROID DISORDER: ICD-10-CM

## 2025-02-06 DIAGNOSIS — R73.03 PRE-DIABETES: ICD-10-CM

## 2025-02-06 DIAGNOSIS — E78.5 HYPERLIPIDEMIA, UNSPECIFIED HYPERLIPIDEMIA TYPE: Primary | ICD-10-CM

## 2025-02-06 DIAGNOSIS — Z12.5 SCREENING FOR PROSTATE CANCER: ICD-10-CM

## 2025-02-10 RX ORDER — METFORMIN HYDROCHLORIDE 500 MG/1
500 TABLET, EXTENDED RELEASE ORAL 2 TIMES DAILY WITH MEALS
Qty: 60 TABLET | Refills: 0 | Status: SHIPPED | OUTPATIENT
Start: 2025-02-10

## 2025-02-10 NOTE — TELEPHONE ENCOUNTER
Last OV relevant to medication: 24  Last refill date: 25 #60/refills: 0  When pt was asked to return for OV: 24  Upcoming appt/reason: No future appointments.  Was pt informed of any over due labs: due- message sent and read on . Sent again and  orders were re-ordered for [facility]. Letter mailed.   Lab Results   Component Value Date     (H) 2024    BUN 20 2024    BUNCREA SEE NOTE: 2024    CREATSERUM 0.86 2024    GFRNAA 101 2022    GFRAA 117 2022    CA 10.3 2024    ALKPHO 113 2024    AST 26 2024    ALT 56 (H) 2024    BILT 1.2 2024    TP 6.8 2024    ALB 4.4 2024    GLOBULIN 2.4 2024    AGRATIO 1.8 2024     2024    K 4.6 2024     2024    CO2 25 2024     Please call to schedule annual physical exam thanks!

## 2025-03-13 DIAGNOSIS — E11.65 TYPE 2 DIABETES MELLITUS WITH HYPERGLYCEMIA (HCC): ICD-10-CM

## 2025-03-13 RX ORDER — METFORMIN HYDROCHLORIDE 500 MG/1
500 TABLET, EXTENDED RELEASE ORAL 2 TIMES DAILY WITH MEALS
Qty: 180 TABLET | Refills: 0 | Status: SHIPPED | OUTPATIENT
Start: 2025-03-13

## 2025-03-13 NOTE — TELEPHONE ENCOUNTER
Last OV relevant to medication: 5/9/24  Last refill date: 2/10/25 #/60refills: 0  When pt was asked to return for OV: 11/9/24  Upcoming appt/reason:   Future Appointments   Date Time Provider Department Center   5/5/2025  2:40 PM Roxana Richardson MD [facility]   Was pt informed of any over due labs: due- read message to complete the labs on 2/10/25  Lab Results   Component Value Date    A1C 6.4 (H) 05/02/2024

## 2025-03-20 LAB
ABSOLUTE BASOPHILS: 41 CELLS/UL (ref 0–200)
ABSOLUTE EOSINOPHILS: 602 CELLS/UL (ref 15–500)
ABSOLUTE LYMPHOCYTES: 1859 CELLS/UL (ref 850–3900)
ABSOLUTE MONOCYTES: 543 CELLS/UL (ref 200–950)
ABSOLUTE NEUTROPHILS: 2856 CELLS/UL (ref 1500–7800)
ALBUMIN/GLOBULIN RATIO: 1.8 (CALC) (ref 1–2.5)
ALBUMIN: 4.7 G/DL (ref 3.6–5.1)
ALKALINE PHOSPHATASE: 111 U/L (ref 35–144)
ALT: 41 U/L (ref 9–46)
AST: 22 U/L (ref 10–35)
BASOPHILS: 0.7 %
BILIRUBIN, TOTAL: 1.6 MG/DL (ref 0.2–1.2)
BUN: 16 MG/DL (ref 7–25)
CALCIUM: 10.1 MG/DL (ref 8.6–10.3)
CARBON DIOXIDE: 27 MMOL/L (ref 20–32)
CHLORIDE: 106 MMOL/L (ref 98–110)
CHOL/HDLC RATIO: 5.3 (CALC)
CHOLESTEROL, TOTAL: 149 MG/DL
CREATININE: 0.93 MG/DL (ref 0.7–1.3)
EGFR: 100 ML/MIN/1.73M2
EOSINOPHILS: 10.2 %
GLOBULIN: 2.6 G/DL (CALC) (ref 1.9–3.7)
GLUCOSE: 162 MG/DL (ref 65–99)
HDL CHOLESTEROL: 28 MG/DL
HEMATOCRIT: 44.2 % (ref 38.5–50)
HEMOGLOBIN A1C: 5.9 % OF TOTAL HGB
HEMOGLOBIN: 14.7 G/DL (ref 13.2–17.1)
LDL-CHOLESTEROL: 93 MG/DL (CALC)
LYMPHOCYTES: 31.5 %
MCH: 30.1 PG (ref 27–33)
MCHC: 33.3 G/DL (ref 32–36)
MCV: 90.4 FL (ref 80–100)
MONOCYTES: 9.2 %
MPV: 9.9 FL (ref 7.5–12.5)
NEUTROPHILS: 48.4 %
NON-HDL CHOLESTEROL: 121 MG/DL (CALC)
PLATELET COUNT: 199 THOUSAND/UL (ref 140–400)
POTASSIUM: 4.5 MMOL/L (ref 3.5–5.3)
PROTEIN, TOTAL: 7.3 G/DL (ref 6.1–8.1)
PSA, TOTAL: 0.32 NG/ML
RDW: 13.5 % (ref 11–15)
RED BLOOD CELL COUNT: 4.89 MILLION/UL (ref 4.2–5.8)
SODIUM: 140 MMOL/L (ref 135–146)
TRIGLYCERIDES: 181 MG/DL
TSH W/REFLEX TO FT4: 1.57 MIU/L (ref 0.4–4.5)
WHITE BLOOD CELL COUNT: 5.9 THOUSAND/UL (ref 3.8–10.8)

## 2025-03-23 NOTE — PROGRESS NOTES
[facility]    CHIEF COMPLAINT:   Chief Complaint   Patient presents with    Routine Physical     Reviewed Preventative/Wellness form with patient. 3/10/22-colon-repeat 7 years. 4/24/24-eye exam. Form given. 5/9/24-foot exam.     Immunization/Injection     Declines vaccines today.           The following individual(s) verbally consented to be recorded using ambient AI listening technology and understand that they can each withdraw their consent to this listening technology at any point by asking the clinician to turn off or pause the recording:    Patient name: Krishna Smith  Additional names:  student Sally Gomez     HPI:   Krishna Smith is a 49 year old male who presents for a complete physical exam.    Colonoscopy done 3/2022, repeat in 7 years. Had an adenoma.   Psa done, normal. Prostate exam declined today. He has no symptoms.     Due tdap, shingrix.   Due prevnar 20.   Covid booster declined.   He is going on vacation, he will get the immunizations at his pharmacy at his convenience.     Exercise: none.     Derm: has numerous moles.     He also needs to follow up regarding diabetes and other chronic medical conditions. He has not been here since 5/2024.     Dm: A1c 5.9. taking metformin.   Eye exam done 4/2024.   Foot exam due.     Hyperlipidemia: low HDL, mildly elevated TG. LDL 93. He has declined statin in the past.     Labs showed elevated bilirubin.   Cbc with elevated eosinophils.   Tsh normal.     History of Present Illness  He has blood in his stool, a symptom he has experienced before due to internal hemorrhoids. This episode has persisted longer than usual, prompting further evaluation. No abdominal pain or symptoms related to eating spicy or greasy meals.     He previously underwent a carotid ultrasound due to lightheadedness, which was normal. No snoring or feeling unrested upon waking.    His diabetes is managed with metformin, one tablet twice a day, and his A1c levels  are well-controlled. He also takes vitamin C and vitamin D supplements. No significant changes in his diabetes management have been noted.    He has a history of elevated bilirubin levels, consistent since 2022, possibly due to Gilbert syndrome.     No urination issues such as frequent urination, nocturia, hesitancy, or hematuria. His PSA test was normal, and there is no family history of prostate cancer.         Wt Readings from Last 6 Encounters:   03/25/25 257 lb (116.6 kg)   05/09/24 256 lb (116.1 kg)   09/08/23 257 lb (116.6 kg)   07/11/23 259 lb 6.4 oz (117.7 kg)   01/27/23 254 lb 12.8 oz (115.6 kg)   06/20/22 239 lb 11.2 oz (108.7 kg)     Body mass index is 29.7 kg/m².       Current Outpatient Medications   Medication Sig Dispense Refill    METFORMIN  MG Oral Tablet 24 Hr TAKE 1 TABLET BY MOUTH TWICE A DAY WITH MEALS 180 tablet 0    Lancets (ONETOUCH DELICA PLUS [id number]) Does not apply Misc 1 strip by In Vitro route daily. 100 each 3    Glucose Blood (ONETOUCH ULTRA) In Vitro Strip 1 each by Other route 3 (three) times daily. 100 strip 3    Blood Glucose Monitoring Suppl (ONETOUCH ULTRA 2) w/Device Does not apply Kit 1 strip by In Vitro route daily.      Ascorbic Acid (VITAMIN C OR) Take by mouth daily.      Cholecalciferol (VITAMIN D-3 OR) Take by mouth daily.        Past Medical History:    Blood in the stool    Have always attributed to hemorrhoids    Calculus of kidney    Only one instance    Constipation    Occassioanlly    Decorative tattoo    Diabetes mellitus (HCC)    Type 2- just diagnosed    Diarrhea, unspecified    Occassionally    Heartburn    Very infrequent    Hemorrhoids    Wears glasses    Since Jr high      History reviewed. No pertinent surgical history.   Family History   Problem Relation Age of Onset    Cancer Father         lung cancer, smoker    Diabetes Father     Other (MI) Mother 69        smoker    Heart Attack Mother     Diabetes Maternal Grandfather     Stroke Maternal  Grandfather     Diabetes Paternal Grandmother     Other (kidney failure) Paternal Grandfather     Diabetes Paternal Grandfather     Diabetes Sister     Neurological Disorder Sister         multiple sclerosis    Diabetes Maternal Uncle     Diabetes Maternal Cousin       Social History:   Social History     Socioeconomic History    Marital status:    Occupational History    Occupation: desk job   Tobacco Use    Smoking status: Former     Current packs/day: 0.00     Average packs/day: 1 pack/day for 19.0 years (19.0 ttl pk-yrs)     Types: Cigarettes     Start date: 1992     Quit date: 2011     Years since quittin.8    Smokeless tobacco: Never   Vaping Use    Vaping status: Never Used   Substance and Sexual Activity    Alcohol use: Not Currently     Comment: approx 3-4 drinks per month    Drug use: No    Sexual activity: Yes     Partners: Female     Social Drivers of Health     Food Insecurity: No Food Insecurity (3/25/2025)    NCSS - Food Insecurity     Worried About Running Out of Food in the Last Year: No     Ran Out of Food in the Last Year: No   Transportation Needs: No Transportation Needs (3/25/2025)    NCSS - Transportation     Lack of Transportation: No   Housing Stability: Not At Risk (3/25/2025)    NCSS - Housing/Utilities     Has Housing: Yes     Worried About Losing Housing: No     Unable to Get Utilities: No     : yes. Children: yes.   Exercise: minimal.  Diet: watches minimally     REVIEW OF SYSTEMS:   GENERAL: feels well otherwise  SKIN: denies any unusual skin lesions  EYES:denies blurred vision or double vision  HEENT: denies nasal congestion, sinus pain or ST  LUNGS: denies shortness of breath with exertion  CARDIOVASCULAR: denies chest pain on exertion  GI: denies abdominal pain,denies heartburn  : denies dysuria  MUSCULOSKELETAL: denies back pain  NEURO: denies headaches  PSYCHE: denies depression or anxiety  HEMATOLOGIC: denies hx of anemia  ENDOCRINE: denies thyroid  history  ALL/ASTHMA: denies hx of allergy or asthma  EXAM:   /70 (BP Location: Right arm, Patient Position: Sitting, Cuff Size: large)   Pulse 76   Temp 97.6 °F (36.4 °C) (Temporal)   Resp 16   Ht 6' 6\" (1.981 m)   Wt 257 lb (116.6 kg)   BMI 29.70 kg/m²   Body mass index is 29.7 kg/m².   GENERAL: well developed, well nourished,in no apparent distress  SKIN: no rashes,no suspicious lesions  HEENT: atraumatic, normocephalic,ears and throat are clear  EYES:PERRLA, conjunctiva are clear  NECK: supple,no adenopathy,no bruits  CHEST: no chest tenderness  LUNGS: clear to auscultation  CARDIO: nl s1 and s2, RRR without murmur  GI: good BS's,no masses, HSM or tenderness  GENITAL/URINARY:  no symptoms. Deferred.   MUSCULOSKELETAL: back is not tender,FROM of the back  EXTREMITIES: no cyanosis, clubbing or edema  NEURO: Oriented times three,cranial nerves are intact,motor and sensory are grossly intact    Bilateral barefoot skin diabetic exam is normal, visualized feet and the appearance is normal.  Bilateral monofilament/sensation of both feet is normal.  Pulsation pedal pulse exam of both lower legs/feet is normal as well.       Labs:   Lab Results   Component Value Date/Time    WBC 5.9 03/19/2025 07:35 AM    HGB 14.7 03/19/2025 07:35 AM     03/19/2025 07:35 AM      Lab Results   Component Value Date/Time     (H) 03/19/2025 07:35 AM     03/19/2025 07:35 AM    K 4.5 03/19/2025 07:35 AM     03/19/2025 07:35 AM    CO2 27 03/19/2025 07:35 AM    CREATSERUM 0.93 03/19/2025 07:35 AM    CA 10.1 03/19/2025 07:35 AM    ALB 4.7 03/19/2025 07:35 AM    TP 7.3 03/19/2025 07:35 AM    ALKPHO 111 03/19/2025 07:35 AM    AST 22 03/19/2025 07:35 AM    ALT 41 03/19/2025 07:35 AM    BILT 1.6 (H) 03/19/2025 07:35 AM        Lab Results   Component Value Date/Time    CHOLEST 149 03/19/2025 07:35 AM    HDL 28 (L) 03/19/2025 07:35 AM    TRIG 181 (H) 03/19/2025 07:35 AM    LDL 93 03/19/2025 07:35 AM    NONHDLC 121  03/19/2025 07:35 AM       Lab Results   Component Value Date/Time    A1C 5.9 (H) 03/19/2025 07:35 AM      Vitamin D:    No results found for: \"VITD\"        ASSESSMENT AND PLAN:   Krishna Smith is a 49 year old male who presents for a complete physical exam.   Return in about 6 months (around 9/25/2025) for diabetes check.  1. Physical exam, annual  HM discussed.   \psa normal   Immunizations discussed   Urged regular exercise.     2. Numerous moles  - DERM - INTERNAL    3. Eosinophilia, unspecified type  Repeat in 2 months.   - CBC W Differential W Platelet [E]    4. Hyperbilirubinemia  Repeat in 2 months. This is chronic for him. Has been stable.   He has no abdominal symptoms.   - Hepatic Function Panel (7) [E]    5. Type 2 diabetes mellitus without complication, unspecified whether long term insulin use (HCC)  Continue metformin.     6. Hyperlipidemia, unspecified hyperlipidemia type  Declines statin   Do heart scan.     7. Blood in stool  See gi as planned.     Assessment & Plan  Blood in stool  Intermittent blood in stool, possibly due to internal hemorrhoids, but the duration is longer than usual. Differential diagnosis includes hemorrhoids and other potential causes that require further evaluation by a gastroenterologist. Given the absence of clear correlation with bowel movement pain, further investigation is warranted.  - Refer to gastroenterologist for evaluation.    Type 2 Diabetes Mellitus  Type 2 diabetes managed with metformin. Hemoglobin A1c is well-controlled. Discussion about the benefits of statin therapy for cardiovascular protection due to increased risk of heart disease from diabetes and metabolic syndrome. Statins are recommended for cardioprotection, but he previously declined. Encouraged to reconsider due to increased risk from metabolic syndrome.  - Continue metformin 1 tablet twice daily.  - Encourage lifestyle modifications including diet and exercise to improve lipid profile.  -  Discuss potential benefits of statin therapy for cardiovascular protection.    Metabolic Syndrome  Presence of metabolic syndrome characterized by diabetes, low HDL, and high triglycerides, increasing the risk of heart disease. Statin therapy is recommended for cardiovascular protection. Discussed the option of a heart scan to assess for plaque in coronary arteries, which may influence decision on statin therapy.  - Encourage lifestyle modifications including diet and exercise to improve lipid profile.  - Discuss potential benefits of statin therapy for cardiovascular protection.  - Consider heart scan to assess coronary artery plaque.    Elevated Bilirubin  Chronic mildly elevated bilirubin levels, likely due to Gilbert's syndrome, a benign condition. No symptoms such as abdominal pain or jaundice reported. Plan to monitor and confirm with fractionated bilirubin test.  - Order fractionated bilirubin test in 2 months to confirm indirect bilirubin elevation.    Eosinophilia  Mild eosinophilia, possibly related to allergies. No significant allergy symptoms reported. Plan to monitor with repeat CBC.  - Repeat CBC in 2 months to monitor eosinophil levels.      Recording duration: 15 minutes     Follow up in 6 months for dm check.     Roxana Richardson MD

## 2025-03-25 ENCOUNTER — OFFICE VISIT (OUTPATIENT)
Dept: INTERNAL MEDICINE CLINIC | Facility: CLINIC | Age: 50
End: 2025-03-25
Payer: COMMERCIAL

## 2025-03-25 VITALS
WEIGHT: 257 LBS | TEMPERATURE: 98 F | BODY MASS INDEX: 29.73 KG/M2 | HEIGHT: 78 IN | RESPIRATION RATE: 16 BRPM | DIASTOLIC BLOOD PRESSURE: 70 MMHG | HEART RATE: 76 BPM | SYSTOLIC BLOOD PRESSURE: 100 MMHG

## 2025-03-25 DIAGNOSIS — Z00.00 PHYSICAL EXAM, ANNUAL: Primary | ICD-10-CM

## 2025-03-25 DIAGNOSIS — D22.9 NUMEROUS MOLES: ICD-10-CM

## 2025-03-25 DIAGNOSIS — D72.10 EOSINOPHILIA, UNSPECIFIED TYPE: ICD-10-CM

## 2025-03-25 DIAGNOSIS — E78.5 HYPERLIPIDEMIA, UNSPECIFIED HYPERLIPIDEMIA TYPE: ICD-10-CM

## 2025-03-25 DIAGNOSIS — E11.9 TYPE 2 DIABETES MELLITUS WITHOUT COMPLICATION, UNSPECIFIED WHETHER LONG TERM INSULIN USE (HCC): ICD-10-CM

## 2025-03-25 DIAGNOSIS — E80.6 HYPERBILIRUBINEMIA: ICD-10-CM

## 2025-03-25 DIAGNOSIS — K92.1 BLOOD IN STOOL: ICD-10-CM

## 2025-03-25 PROCEDURE — 99396 PREV VISIT EST AGE 40-64: CPT | Performed by: INTERNAL MEDICINE

## 2025-03-25 PROCEDURE — 99214 OFFICE O/P EST MOD 30 MIN: CPT | Performed by: INTERNAL MEDICINE

## 2025-05-13 ENCOUNTER — TELEPHONE (OUTPATIENT)
Dept: INTERNAL MEDICINE CLINIC | Facility: CLINIC | Age: 50
End: 2025-05-13

## 2025-05-13 NOTE — TELEPHONE ENCOUNTER
Prep instructions per GI, see encounter 4/22/25 for instruction/details. Called pt's spouse Koki and they did see these and understand those directions.

## 2025-05-13 NOTE — TELEPHONE ENCOUNTER
Patient is having a colonoscopy and was instructed to  OTC powder packets for gas relief. Patients wife says she looked but could only find the tablets and was wondering f we had any recommendations about the packets. Please advise thank you!

## 2025-05-14 PROBLEM — K92.1 HEMATOCHEZIA: Status: ACTIVE | Noted: 2025-05-14

## 2025-05-27 ENCOUNTER — ORDER TRANSCRIPTION (OUTPATIENT)
Dept: ADMINISTRATIVE | Facility: HOSPITAL | Age: 50
End: 2025-05-27

## 2025-05-27 DIAGNOSIS — Z13.6 SCREENING FOR CARDIOVASCULAR CONDITION: Primary | ICD-10-CM

## 2025-05-30 ENCOUNTER — TELEPHONE (OUTPATIENT)
Dept: INTERNAL MEDICINE CLINIC | Facility: CLINIC | Age: 50
End: 2025-05-30

## 2025-06-11 ENCOUNTER — MED REC SCAN ONLY (OUTPATIENT)
Dept: INTERNAL MEDICINE CLINIC | Facility: CLINIC | Age: 50
End: 2025-06-11

## 2025-06-13 DIAGNOSIS — E11.65 TYPE 2 DIABETES MELLITUS WITH HYPERGLYCEMIA (HCC): ICD-10-CM

## 2025-06-15 DIAGNOSIS — E11.65 TYPE 2 DIABETES MELLITUS WITH HYPERGLYCEMIA (HCC): ICD-10-CM

## 2025-06-16 RX ORDER — METFORMIN HYDROCHLORIDE 500 MG/1
500 TABLET, EXTENDED RELEASE ORAL 2 TIMES DAILY WITH MEALS
Qty: 180 TABLET | Refills: 0 | Status: SHIPPED | OUTPATIENT
Start: 2025-06-16

## 2025-06-16 RX ORDER — METFORMIN HYDROCHLORIDE 500 MG/1
500 TABLET, EXTENDED RELEASE ORAL 2 TIMES DAILY WITH MEALS
Qty: 180 TABLET | Refills: 0 | OUTPATIENT
Start: 2025-06-16

## 2025-06-16 NOTE — TELEPHONE ENCOUNTER
Last OV relevant to medication: 3/25/25  Last refill date: 3/13/25 #180/refills: 0  When pt was asked to return for OV: 9/25/25  Upcoming appt/reason:   Future Appointments   Date Time Provider Department Center   6/20/2025  3:40 PM Vimal Garcia DO [facility]   6/23/2025  7:30 AM  [facility]   9/26/2025  8:00 AM Roxana Richardson MD [facility]   Was pt informed of any over due labs: message sent  Lab Results   Component Value Date    A1C 5.9 (H) 03/19/2025

## 2025-06-23 ENCOUNTER — HOSPITAL ENCOUNTER (OUTPATIENT)
Dept: CT IMAGING | Facility: HOSPITAL | Age: 50
Discharge: HOME | End: 2025-06-23
Attending: INTERNAL MEDICINE

## 2025-06-23 DIAGNOSIS — Z13.6 SCREENING FOR CARDIOVASCULAR CONDITION: ICD-10-CM

## 2025-08-18 ENCOUNTER — TELEPHONE (OUTPATIENT)
Dept: INTERNAL MEDICINE CLINIC | Facility: CLINIC | Age: 50
End: 2025-08-18

## 2025-08-18 DIAGNOSIS — R89.8 EOSINOPHIL COUNT RAISED: Primary | ICD-10-CM

## 2025-08-18 DIAGNOSIS — E11.9 TYPE 2 DIABETES MELLITUS WITHOUT COMPLICATION, UNSPECIFIED WHETHER LONG TERM INSULIN USE (HCC): ICD-10-CM

## (undated) DIAGNOSIS — E11.65 TYPE 2 DIABETES MELLITUS WITH HYPERGLYCEMIA, WITHOUT LONG-TERM CURRENT USE OF INSULIN (HCC): ICD-10-CM

## (undated) NOTE — LETTER
Diabetes Retinal Eye Examination Report    Patient Name: Krishna Smith                                        : 1975    Referring Physician: Roxana Richardson MD  _____________________________________________________________________________  Patient - Please bring this form to your next eye examination appointment.   Give it to your eye care professional to fill out and return via fax to your primary care provider.     Eye Care Professional - Please fill out this form and fax it back to the referring  primary care physician.   _____________________________________________________________________________     Date of Exam: ___/___/___    The patient received a dilated fundus examination with the following results:    ___ No diabetic retinopathy detected  ___ Background retinopathy was detected, but only requires monitoring  ___ Retinopathy requiring further testing and/or treatment was detected. See notes below.    Notes / Commentary / Recommendations:  _________________________________________________________________________________________________________________________________________________________________________________________________________________________________    The patient is to return for follow-up / evaluation in ____ months.    Eye Care Provider (Print): _______________________Signature___________________ Date ___ / ___/___    Clinic/Office name: _______________________Ph:_____________Fax:_____________

## (undated) NOTE — LETTER
2/16/2022        I would like to refer you Shelby Moyer. [id number]    Referring Provider: Nelly Galindo MD    Fax: [phone]    As soon as the patient is seen please complete the form below and fax to the referring provider without a cover sheet. Exam Date _______________    Best corrected vision compared to last visit:    Unchanged               Better                       Worse                            No previous visit to compare    Retinal changes compared to last visit:    No retinopathy          Unchanged               Worse               Retinopathy needs Tx Laser/Surgery    For patients with cataracts compared to last visit:    Unchanged               Worse           No previous visit to compare        Cataracts need surgery    Other finding and diagnosis________________________________________________    Treatment recommended__________________________________________________    Return Visit_____________________________________________________________    Thank you for your professional help in treating our patient. Ophthalmologist (Print):_________________________________Signature__________________________    Address: _________________________________________________________________    Telephone: _______________________     Please provide Dr. Richardson___copies of my medical records as requested.     Patient's signature___________________________________Date_________________________

## (undated) NOTE — LETTER
February 10, 2025        Krishna Smith  [address] [facility] [address] [facility] [address]      Dear Krishna:    We have attempted to contact youwith no success. In an effort to provide quality patient care we are reaching out to you to please complete your overdue labs and schedule your annual physical exam asap.    As a friendly reminder, the labs are fasting so:  No food for 10-12 hrs, please drink plenty of water and it is okay take medications at least 1 hour prior to blood draw. Please refrain from taking any Biotin (Vitamin B7) or Biotin containing supplements at least 3-4 days prior to blood draw.    The lab orders are attached as well.    If you have established care with a different provider, please call our office so we can update your file to list the correct provider name and information. Once we update your file with this information it will eliminate further phone calls and/or correspondence from our office.Thank you for your cooperation.      If you have recently contacted us, please disregard this letter.      You may call the office at [phone] our phones are on service Monday-Thursday 8am-4:45 pm and Friday 8am-3 pm.      Thank you,      The office of

## (undated) NOTE — LETTER
1/27/2023        I would like to refer you Crayton Callrangel. 5/8/1975    Referring Provider: Carrie Gandhi MD    Fax: [phone]    As soon as the patient is seen please complete the form below and fax to the referring provider without a cover sheet. Exam Date _______________    Best corrected vision compared to last visit:    Unchanged               Better                       Worse                            No previous visit to compare    Retinal changes compared to last visit:    No retinopathy          Unchanged               Worse               Retinopathy needs Tx Laser/Surgery    For patients with cataracts compared to last visit:    Unchanged               Worse           No previous visit to compare        Cataracts need surgery    Other finding and diagnosis________________________________________________    Treatment recommended__________________________________________________    Return Visit_____________________________________________________________    Thank you for your professional help in treating our patient. Ophthalmologist (Print):_________________________________Signature__________________________    Address: _________________________________________________________________    Telephone: _______________________     Please provide _copies of my medical records as requested.     Patient's signature___________________________________Date_________________________

## (undated) NOTE — LETTER
January 7, 2025        Krishna Smith  [address] [address]      Dear Krishna:      In an effort to provide quality patient care we are reaching out to you to contact the office at your earliest convenience to schedule your annual physical exam due in November 2024. You have some outstanding labs as well, please get these drawn prior to your exam as well.    As a reminder, The labs are fasting so:  No food for 10-12 hrs, please drink plenty of water and it is okay take medications at least 1 hour prior to blood draw. Please refrain from taking any Biotin (Vitamin B7) or Biotin containing supplements at least 3-4 days prior to blood draw.    If you have established care with a different provider, please call our office so we can update your file to list the correct provider name and information. Once we update your file with this information it will eliminate further phone calls and/or correspondence from our office.Thank you for your cooperation.      If you have recently contacted us, please disregard this letter.      You may call the office at [phone] our phones are on service Monday-Thursday 8am-4:45 pm and Friday 8am-3 pm.      Thank you,      The office of